# Patient Record
Sex: FEMALE | Race: BLACK OR AFRICAN AMERICAN | URBAN - METROPOLITAN AREA
[De-identification: names, ages, dates, MRNs, and addresses within clinical notes are randomized per-mention and may not be internally consistent; named-entity substitution may affect disease eponyms.]

---

## 2024-04-30 ENCOUNTER — LAB (OUTPATIENT)
Dept: URBAN - METROPOLITAN AREA CLINIC 74 | Facility: CLINIC | Age: 74
End: 2024-04-30

## 2024-04-30 ENCOUNTER — OV NP (OUTPATIENT)
Dept: URBAN - METROPOLITAN AREA CLINIC 74 | Facility: CLINIC | Age: 74
End: 2024-04-30

## 2024-04-30 VITALS
SYSTOLIC BLOOD PRESSURE: 126 MMHG | BODY MASS INDEX: 31.82 KG/M2 | TEMPERATURE: 97.7 F | DIASTOLIC BLOOD PRESSURE: 62 MMHG | WEIGHT: 191 LBS | HEART RATE: 88 BPM | OXYGEN SATURATION: 97 % | HEIGHT: 65 IN

## 2024-04-30 PROBLEM — 235595009: Status: ACTIVE | Noted: 2024-04-30

## 2024-04-30 RX ORDER — AMLODIPINE BESYLATE 10 MG/1
1 TABLET TABLET ORAL ONCE A DAY
Status: ACTIVE | COMMUNITY

## 2024-04-30 RX ORDER — EVE PRIMROSE/LINOLEIC/G-LENIC 1000 MG
AS DIRECTED CAPSULE ORAL
Status: ACTIVE | COMMUNITY

## 2024-04-30 NOTE — HPI-TODAY'S VISIT:
Patient Presentation: The patient presents with a chief complaint of persistent sour stomach and increased gas production over the past two months. She reports that she can no longer tolerate hot tea or hot water with lemon juice in the evenings, as these beverages now cause irritation. Additionally, the patient experiences bloating, which has been a long-standing issue. The patient occasionally experiences pain, describing it as a burning, sour sensation. Medical History and Current Medications: The patient has been on Ozempic for approximately one month, after having been on it previously but discontinuing due to cost. She has diabetes, which is currently under control with a recent HbA1c of 7.0%. The patient denies any heart or lung problems. Additional Information: The patient has eliminated onions and broccoli from her diet in an attempt to alleviate her bloating symptoms. Her bowel movements are regular, occurring either in the evening or early in the morning, and she reports having a bowel movement every day.

## 2024-05-16 ENCOUNTER — OUT OF OFFICE VISIT (OUTPATIENT)
Dept: URBAN - METROPOLITAN AREA SURGERY CENTER 30 | Facility: SURGERY CENTER | Age: 74
End: 2024-05-16
Payer: COMMERCIAL

## 2024-05-16 ENCOUNTER — CLAIMS CREATED FROM THE CLAIM WINDOW (OUTPATIENT)
Dept: URBAN - METROPOLITAN AREA CLINIC 4 | Facility: CLINIC | Age: 74
End: 2024-05-16
Payer: COMMERCIAL

## 2024-05-16 DIAGNOSIS — R10.13 ABDOMINAL DISCOMFORT, EPIGASTRIC: ICD-10-CM

## 2024-05-16 DIAGNOSIS — K21.00 ALKALINE REFLUX ESOPHAGITIS: ICD-10-CM

## 2024-05-16 DIAGNOSIS — K44.9 HIATAL HERNIA: ICD-10-CM

## 2024-05-16 DIAGNOSIS — K31.89 OTHER DISEASES OF STOMACH AND DUODENUM: ICD-10-CM

## 2024-05-16 DIAGNOSIS — K20.80 ESOPHAGITIS, LOS ANGELES GRADE A: ICD-10-CM

## 2024-05-16 DIAGNOSIS — R12 HEARTBURN: ICD-10-CM

## 2024-05-16 DIAGNOSIS — R10.13 EPIGASTRIC ABDOMINAL PAIN: ICD-10-CM

## 2024-05-16 PROCEDURE — 00731 ANES UPR GI NDSC PX NOS: CPT | Performed by: NURSE ANESTHETIST, CERTIFIED REGISTERED

## 2024-05-16 PROCEDURE — G8907 PT DOC NO EVENTS ON DISCHARG: HCPCS | Performed by: INTERNAL MEDICINE

## 2024-05-16 PROCEDURE — 43239 EGD BIOPSY SINGLE/MULTIPLE: CPT | Performed by: INTERNAL MEDICINE

## 2024-05-16 PROCEDURE — 88312 SPECIAL STAINS GROUP 1: CPT | Performed by: PATHOLOGY

## 2024-05-16 PROCEDURE — 88305 TISSUE EXAM BY PATHOLOGIST: CPT | Performed by: PATHOLOGY

## 2024-06-23 PROBLEM — 266433003: Status: ACTIVE | Noted: 2024-06-23

## 2024-06-23 PROBLEM — 196735001: Status: ACTIVE | Noted: 2024-06-23

## 2024-06-27 ENCOUNTER — DASHBOARD ENCOUNTERS (OUTPATIENT)
Age: 74
End: 2024-06-27

## 2024-06-27 ENCOUNTER — OFFICE VISIT (OUTPATIENT)
Dept: URBAN - METROPOLITAN AREA CLINIC 74 | Facility: CLINIC | Age: 74
End: 2024-06-27
Payer: COMMERCIAL

## 2024-06-27 VITALS
SYSTOLIC BLOOD PRESSURE: 140 MMHG | HEIGHT: 65 IN | BODY MASS INDEX: 32.92 KG/M2 | TEMPERATURE: 98 F | HEART RATE: 83 BPM | DIASTOLIC BLOOD PRESSURE: 62 MMHG | WEIGHT: 197.6 LBS

## 2024-06-27 DIAGNOSIS — K21.00 CHRONIC REFLUX ESOPHAGITIS: ICD-10-CM

## 2024-06-27 DIAGNOSIS — K44.9 HIATAL HERNIA: ICD-10-CM

## 2024-06-27 DIAGNOSIS — K29.30 CHRONIC SUPERFICIAL GASTRITIS WITHOUT BLEEDING: ICD-10-CM

## 2024-06-27 DIAGNOSIS — Z53.20 COLON CANCER SCREENING DECLINED: ICD-10-CM

## 2024-06-27 PROCEDURE — 99212 OFFICE O/P EST SF 10 MIN: CPT | Performed by: PHYSICIAN ASSISTANT

## 2024-06-27 RX ORDER — AMLODIPINE BESYLATE 10 MG/1
1 TABLET TABLET ORAL ONCE A DAY
Status: ACTIVE | COMMUNITY

## 2024-06-27 RX ORDER — EVE PRIMROSE/LINOLEIC/G-LENIC 1000 MG
AS DIRECTED CAPSULE ORAL
Status: ACTIVE | COMMUNITY

## 2024-06-27 NOTE — HPI-TODAY'S VISIT:
The patient is 74-year-old female with past medical history as noted below known to Dr. Munoz is presenting to our clinic today to discuss her recent EGD results. She is currently on Aloe vera Juice and she is doing much better since last visit. No GI issues today.   Procedure: -- EGD with biopsy on 05/16/2024 by Dr. Munoz noted LA grade A reflux esophagitis with no bleeding.  2 cm hiatal hernia.  Chronic gastritis.  Normal examined duodenum.  Biopsy stomach with foveolar hyperplasia.  No H.pylori organisms or intestinal metaplasia.

## 2024-06-27 NOTE — PHYSICAL EXAM HENT:
Head, normocephalic, atraumatic, Face, Face within normal limits, Ears, External ears within normal limits
Patient denies falls, 1 FOS upon entry to home right rail ascending, no use of DME, stated she sometimes does not leave house all week but walks a long corridor she has within the home, no formal exercise routine, family assists with groceries, independent with bathing dressing, laundry, etc.